# Patient Record
Sex: MALE | Race: OTHER | NOT HISPANIC OR LATINO | URBAN - METROPOLITAN AREA
[De-identification: names, ages, dates, MRNs, and addresses within clinical notes are randomized per-mention and may not be internally consistent; named-entity substitution may affect disease eponyms.]

---

## 2021-06-24 ENCOUNTER — EMERGENCY (EMERGENCY)
Facility: HOSPITAL | Age: 46
LOS: 1 days | Discharge: HOME | End: 2021-06-24
Attending: EMERGENCY MEDICINE | Admitting: STUDENT IN AN ORGANIZED HEALTH CARE EDUCATION/TRAINING PROGRAM
Payer: COMMERCIAL

## 2021-06-24 VITALS
TEMPERATURE: 98 F | SYSTOLIC BLOOD PRESSURE: 160 MMHG | HEART RATE: 67 BPM | DIASTOLIC BLOOD PRESSURE: 90 MMHG | OXYGEN SATURATION: 100 % | RESPIRATION RATE: 18 BRPM

## 2021-06-24 LAB
ALBUMIN SERPL ELPH-MCNC: 4.6 G/DL — SIGNIFICANT CHANGE UP (ref 3.3–5)
ALP SERPL-CCNC: 77 U/L — SIGNIFICANT CHANGE UP (ref 40–120)
ALT FLD-CCNC: 33 U/L — SIGNIFICANT CHANGE UP (ref 4–41)
ANION GAP SERPL CALC-SCNC: 12 MMOL/L — SIGNIFICANT CHANGE UP (ref 7–14)
APPEARANCE UR: CLEAR — SIGNIFICANT CHANGE UP
AST SERPL-CCNC: 21 U/L — SIGNIFICANT CHANGE UP (ref 4–40)
B PERT DNA SPEC QL NAA+PROBE: SIGNIFICANT CHANGE UP
BASOPHILS # BLD AUTO: 0.05 K/UL — SIGNIFICANT CHANGE UP (ref 0–0.2)
BASOPHILS NFR BLD AUTO: 0.5 % — SIGNIFICANT CHANGE UP (ref 0–2)
BILIRUB SERPL-MCNC: 0.6 MG/DL — SIGNIFICANT CHANGE UP (ref 0.2–1.2)
BILIRUB UR-MCNC: NEGATIVE — SIGNIFICANT CHANGE UP
BUN SERPL-MCNC: 15 MG/DL — SIGNIFICANT CHANGE UP (ref 7–23)
C PNEUM DNA SPEC QL NAA+PROBE: SIGNIFICANT CHANGE UP
CALCIUM SERPL-MCNC: 9.2 MG/DL — SIGNIFICANT CHANGE UP (ref 8.4–10.5)
CHLORIDE SERPL-SCNC: 101 MMOL/L — SIGNIFICANT CHANGE UP (ref 98–107)
CO2 SERPL-SCNC: 26 MMOL/L — SIGNIFICANT CHANGE UP (ref 22–31)
COLOR SPEC: YELLOW — SIGNIFICANT CHANGE UP
CREAT SERPL-MCNC: 1.13 MG/DL — SIGNIFICANT CHANGE UP (ref 0.5–1.3)
DIFF PNL FLD: ABNORMAL
EOSINOPHIL # BLD AUTO: 0.24 K/UL — SIGNIFICANT CHANGE UP (ref 0–0.5)
EOSINOPHIL NFR BLD AUTO: 2.2 % — SIGNIFICANT CHANGE UP (ref 0–6)
FLUAV SUBTYP SPEC NAA+PROBE: SIGNIFICANT CHANGE UP
FLUBV RNA SPEC QL NAA+PROBE: SIGNIFICANT CHANGE UP
GLUCOSE SERPL-MCNC: 144 MG/DL — HIGH (ref 70–99)
GLUCOSE UR QL: NEGATIVE — SIGNIFICANT CHANGE UP
HADV DNA SPEC QL NAA+PROBE: SIGNIFICANT CHANGE UP
HCOV 229E RNA SPEC QL NAA+PROBE: SIGNIFICANT CHANGE UP
HCOV HKU1 RNA SPEC QL NAA+PROBE: SIGNIFICANT CHANGE UP
HCOV NL63 RNA SPEC QL NAA+PROBE: SIGNIFICANT CHANGE UP
HCOV OC43 RNA SPEC QL NAA+PROBE: SIGNIFICANT CHANGE UP
HCT VFR BLD CALC: 44.5 % — SIGNIFICANT CHANGE UP (ref 39–50)
HGB BLD-MCNC: 14.2 G/DL — SIGNIFICANT CHANGE UP (ref 13–17)
HMPV RNA SPEC QL NAA+PROBE: SIGNIFICANT CHANGE UP
HPIV1 RNA SPEC QL NAA+PROBE: SIGNIFICANT CHANGE UP
HPIV2 RNA SPEC QL NAA+PROBE: SIGNIFICANT CHANGE UP
HPIV3 RNA SPEC QL NAA+PROBE: SIGNIFICANT CHANGE UP
HPIV4 RNA SPEC QL NAA+PROBE: SIGNIFICANT CHANGE UP
IANC: 6.93 K/UL — SIGNIFICANT CHANGE UP (ref 1.5–8.5)
IMM GRANULOCYTES NFR BLD AUTO: 0.4 % — SIGNIFICANT CHANGE UP (ref 0–1.5)
KETONES UR-MCNC: NEGATIVE — SIGNIFICANT CHANGE UP
LEUKOCYTE ESTERASE UR-ACNC: NEGATIVE — SIGNIFICANT CHANGE UP
LYMPHOCYTES # BLD AUTO: 2.86 K/UL — SIGNIFICANT CHANGE UP (ref 1–3.3)
LYMPHOCYTES # BLD AUTO: 26.1 % — SIGNIFICANT CHANGE UP (ref 13–44)
MCHC RBC-ENTMCNC: 28.1 PG — SIGNIFICANT CHANGE UP (ref 27–34)
MCHC RBC-ENTMCNC: 31.9 GM/DL — LOW (ref 32–36)
MCV RBC AUTO: 88.1 FL — SIGNIFICANT CHANGE UP (ref 80–100)
MONOCYTES # BLD AUTO: 0.84 K/UL — SIGNIFICANT CHANGE UP (ref 0–0.9)
MONOCYTES NFR BLD AUTO: 7.7 % — SIGNIFICANT CHANGE UP (ref 2–14)
NEUTROPHILS # BLD AUTO: 6.93 K/UL — SIGNIFICANT CHANGE UP (ref 1.8–7.4)
NEUTROPHILS NFR BLD AUTO: 63.1 % — SIGNIFICANT CHANGE UP (ref 43–77)
NITRITE UR-MCNC: NEGATIVE — SIGNIFICANT CHANGE UP
NRBC # BLD: 0 /100 WBCS — SIGNIFICANT CHANGE UP
NRBC # FLD: 0 K/UL — SIGNIFICANT CHANGE UP
PH UR: 6 — SIGNIFICANT CHANGE UP (ref 5–8)
PLATELET # BLD AUTO: 293 K/UL — SIGNIFICANT CHANGE UP (ref 150–400)
POTASSIUM SERPL-MCNC: 4.2 MMOL/L — SIGNIFICANT CHANGE UP (ref 3.5–5.3)
POTASSIUM SERPL-SCNC: 4.2 MMOL/L — SIGNIFICANT CHANGE UP (ref 3.5–5.3)
PROT SERPL-MCNC: 7.8 G/DL — SIGNIFICANT CHANGE UP (ref 6–8.3)
PROT UR-MCNC: ABNORMAL
RAPID RVP RESULT: SIGNIFICANT CHANGE UP
RBC # BLD: 5.05 M/UL — SIGNIFICANT CHANGE UP (ref 4.2–5.8)
RBC # FLD: 13.2 % — SIGNIFICANT CHANGE UP (ref 10.3–14.5)
RSV RNA SPEC QL NAA+PROBE: SIGNIFICANT CHANGE UP
RV+EV RNA SPEC QL NAA+PROBE: SIGNIFICANT CHANGE UP
SARS-COV-2 RNA SPEC QL NAA+PROBE: SIGNIFICANT CHANGE UP
SODIUM SERPL-SCNC: 139 MMOL/L — SIGNIFICANT CHANGE UP (ref 135–145)
SP GR SPEC: 1.03 — HIGH (ref 1.01–1.02)
UROBILINOGEN FLD QL: SIGNIFICANT CHANGE UP
WBC # BLD: 10.96 K/UL — HIGH (ref 3.8–10.5)
WBC # FLD AUTO: 10.96 K/UL — HIGH (ref 3.8–10.5)

## 2021-06-24 PROCEDURE — 74176 CT ABD & PELVIS W/O CONTRAST: CPT | Mod: 26

## 2021-06-24 PROCEDURE — 99218: CPT

## 2021-06-24 RX ORDER — KETOROLAC TROMETHAMINE 30 MG/ML
15 SYRINGE (ML) INJECTION EVERY 6 HOURS
Refills: 0 | Status: DISCONTINUED | OUTPATIENT
Start: 2021-06-24 | End: 2021-06-25

## 2021-06-24 RX ORDER — TAMSULOSIN HYDROCHLORIDE 0.4 MG/1
1 CAPSULE ORAL
Qty: 14 | Refills: 0
Start: 2021-06-24 | End: 2021-07-07

## 2021-06-24 RX ORDER — TAMSULOSIN HYDROCHLORIDE 0.4 MG/1
0.4 CAPSULE ORAL ONCE
Refills: 0 | Status: COMPLETED | OUTPATIENT
Start: 2021-06-24 | End: 2021-06-24

## 2021-06-24 RX ORDER — SODIUM CHLORIDE 9 MG/ML
1000 INJECTION INTRAMUSCULAR; INTRAVENOUS; SUBCUTANEOUS
Refills: 0 | Status: DISCONTINUED | OUTPATIENT
Start: 2021-06-24 | End: 2021-06-25

## 2021-06-24 RX ORDER — KETOROLAC TROMETHAMINE 30 MG/ML
15 SYRINGE (ML) INJECTION ONCE
Refills: 0 | Status: DISCONTINUED | OUTPATIENT
Start: 2021-06-24 | End: 2021-06-24

## 2021-06-24 RX ORDER — SODIUM CHLORIDE 9 MG/ML
1000 INJECTION INTRAMUSCULAR; INTRAVENOUS; SUBCUTANEOUS ONCE
Refills: 0 | Status: COMPLETED | OUTPATIENT
Start: 2021-06-24 | End: 2021-06-24

## 2021-06-24 RX ORDER — ONDANSETRON 8 MG/1
4 TABLET, FILM COATED ORAL ONCE
Refills: 0 | Status: COMPLETED | OUTPATIENT
Start: 2021-06-24 | End: 2021-06-24

## 2021-06-24 RX ADMIN — Medication 15 MILLIGRAM(S): at 11:44

## 2021-06-24 RX ADMIN — Medication 15 MILLIGRAM(S): at 19:41

## 2021-06-24 RX ADMIN — SODIUM CHLORIDE 125 MILLILITER(S): 9 INJECTION INTRAMUSCULAR; INTRAVENOUS; SUBCUTANEOUS at 11:43

## 2021-06-24 RX ADMIN — TAMSULOSIN HYDROCHLORIDE 0.4 MILLIGRAM(S): 0.4 CAPSULE ORAL at 11:11

## 2021-06-24 RX ADMIN — ONDANSETRON 4 MILLIGRAM(S): 8 TABLET, FILM COATED ORAL at 08:06

## 2021-06-24 RX ADMIN — Medication 15 MILLIGRAM(S): at 08:01

## 2021-06-24 RX ADMIN — Medication 15 MILLIGRAM(S): at 19:20

## 2021-06-24 RX ADMIN — SODIUM CHLORIDE 1000 MILLILITER(S): 9 INJECTION INTRAMUSCULAR; INTRAVENOUS; SUBCUTANEOUS at 08:06

## 2021-06-24 NOTE — ED CDU PROVIDER INITIAL DAY NOTE - NSCAREINITIATED _GEN_ER
Last office visit 2/8/2017  Last refill 11/02/2017  #30  
Patient called requesting refill    Aware Dr Esteves not in   Current Outpatient Prescriptions   Medication Sig   • HYDROcodone-acetaminophen (NORCO) 5-325 MG per tablet Take 1 tablet by mouth 2 times daily as needed for Pain.   • hydrochlorothiazide (HYDRODIURIL) 25 MG tablet Take 1 tablet by mouth daily.   • lisinopril (ZESTRIL) 10 MG tablet Take 1 tablet by mouth daily.   • simvastatin (ZOCOR) 10 MG tablet Take 1 tablet by mouth nightly.   • cyclobenzaprine (FLEXERIL) 5 MG tablet Take 1 tablet by mouth 3 times daily as needed for Muscle spasms.   • acetaminophen-codeine (TYLENOL NO.3) 300-30 MG per tablet 1 tab PO Q 6 hrs PRN Pain   • fluocinonide (LIDEX) 0.05 % cream Apply to affected area as needed     No current facility-administered medications for this visit.        
Peewee Rea(Resident)

## 2021-06-24 NOTE — ED PROVIDER NOTE - ABDOMINAL EXAM
mild right sided CVA tenderness, no abdominal tenderness, no suprapubic tenderness, no rebound, no guarding/soft/nondistended/no organomegaly

## 2021-06-24 NOTE — ED PROVIDER NOTE - CLINICAL SUMMARY MEDICAL DECISION MAKING FREE TEXT BOX
45 y/o M with h/o kidney stones presents with right flank pain onset several hours. Suspect likely kidney stone. Plan for basic labs, UA, pain management and stone hunt.

## 2021-06-24 NOTE — ED PROVIDER NOTE - NSFOLLOWUPINSTRUCTIONS_ED_ALL_ED_FT
Please follow up with your primary care provider for further concerns you may have regarding your general health. Attached you will find your results from today's visit. Continue taking your medications as prescribed and keep your upcoming medical appointments.    You have been diagnosed with a kidney stone. To control the pain, you should take Ibuprofen 400 mg along with Tylenol 650mg every 6 hours. These are both over the counter medications that you can  at your local pharmacy without a prescription. We also sent a stronger pain medication to your pharmacy that you should only take when you are still having pain despite trying Tylenol and Ibuprofen. If you are still having severe pain in 48 hours you should return to the emergency room or a urologist if able. If you began having fever, uncontrollable nausea and vomiting then you also need to come back to the ED.

## 2021-06-24 NOTE — ED CDU PROVIDER INITIAL DAY NOTE - NS ED ROS FT
ROS:  GENERAL: No fever, no chills  EYES: no change in vision  HEENT: no trouble swallowing, no trouble speaking  CARDIAC: no chest pain  PULMONARY: no cough, no shortness of breath  GI: +abdominal pain, +nausea, +vomiting, no diarrhea, no constipation  : No dysuria, no frequency  SKIN: no rashes  NEURO: no headache, no weakness  MSK: No joint pain

## 2021-06-24 NOTE — ED CDU PROVIDER INITIAL DAY NOTE - PHYSICAL EXAMINATION
Vital signs reviewed.   CONSTITUTIONAL: Well-appearing; well-nourished; in no apparent distress. Non-toxic appearing.   HEAD: Normocephalic, atraumatic.  EYES: PERRL, EOM intact, conjunctiva and sclera WNL.  NECK/LYMPH: Supple; non-tender  CARD: Normal S1, S2; RRR  RESP: Normal chest excursion with respiration; breath sounds clear and equal bilaterally; no wheezes, rhonchi, or rales.  ABD/GI: soft, non-distended; R CVAT noted  EXT/MS: moves all extremities; distal pulses are normal, no pedal edema.  SKIN: Normal for age and race; warm; dry; good turgor; no apparent lesions or exudate noted.  NEURO: Awake, alert, oriented x 3, no gross deficits, CN II-XII grossly intact, no motor or sensory deficit noted.  PSYCH: Normal mood; appropriate affect.

## 2021-06-24 NOTE — ED PROVIDER NOTE - OBJECTIVE STATEMENT
45 y/o M with h/o recurrent kidney stones presents to the ED c/o right flank pain starting acutely this morning. Pain is localized to the right flank without radiating. Endorsing nausea. No urinary symptoms. No medication taken. Denies chest pain, fever, chills, vomiting. No further abdominal pain. PSHx: appendectomy

## 2021-06-24 NOTE — ED PROVIDER NOTE - PROGRESS NOTE DETAILS
eleno: apparent prox kidney stone, will cdu for pain management. Pt improved but pain starting to come back.  WAnts to hold on pain medication for now. Pt to go to the cdu for pain management and observation.

## 2021-06-24 NOTE — ED CDU PROVIDER INITIAL DAY NOTE - MEDICAL DECISION MAKING DETAILS
Kidney stone/renal colic- pain control, gentle hydration, am labs Kidney stone/renal colic- pain control, gentle hydration, am labs Pt much improved from ED but still with intermittent pain.  IVF, pain control and reassess

## 2021-06-24 NOTE — ED CDU PROVIDER INITIAL DAY NOTE - OBJECTIVE STATEMENT
47 y/o M with h/o recurrent kidney stones presents to the ED c/o right flank pain starting acutely this morning. Pain is localized to the right flank without radiating. Endorsing nausea. No urinary symptoms. No medication taken. Denies chest pain, fever, chills, vomiting. No further abdominal pain. PSHx: appendectomu    CDU JUAN JOSÉ Shin: Agree with above except as noted. Patient is a 47 y/o M w/ hx kidney stones (never requiring surgical intervention), here c/o sudden onset of R flank pain this morning. In the ED, labs were unremarkable, CTAP confirmed a proximal R 0.4cm ureteral stone. Pt was sent to the CDU for pain control. Pt denies dysuria, states pain is well controlled with Toradol, has received 2 doses so far. Denies fevers, dysuria, or any other complaints.

## 2021-06-24 NOTE — ED PROVIDER NOTE - CPE EDP SKIN NORM
[address] [facility] [address]  Department of Interventional Radiology  [phone] Office  [phone] Fax  POST LUMBAR PUNCTURE/MYELOGRAM/INTRATHECAL CHEMOTHERAPY DISCHARGE INSTRUCTIONS  General Information:  Lumbar Puncture: A LP is done to help diagnose several disorders, like pseudo tumor, migraines, meningitis, and multiple sclerosis. It involves a puncture (usually in the lower spine) into the sac that protects the spinal column. A sample of the fluid in that space is removed and tested in the lab. Myelogram:     A Myelogram involves a lumbar puncture, and instead of removing fluid, contrast will be injected into the sac surrounding the spinal column. It is done to visualize the spinal column, nerve roots, spinal canal, vertebral discs and disc space. It is usually done to diagnose back pain with unknown cause or in preparation for surgery. After the injection, a CT scan will be done, usually within two hours of the injection. Intrathecal Chemotherapy:      Chemotherapy can be given in many forms. Intrathecal chemo involves a lumbar puncture, and instead of removing fluid, the chemo will be injected into the space. After any of procedures, you will be asked to lie flat on your back for 4-6 hours to prevent complications. You should also rest for 24 hours after you go home, and force fluids. If you have a headache, you should take Tylenol or acetaminophen. Call If:     You should call your Physician and/or the Radiology Nurse if you develop a headache that is not relieved by Tylenol, and worsens when you stand and eases when you lie down, you need to call. You may have developed what is referred to as a spinal headache. Our physician's will probably advise you to be on strict bed rest for 24 hours, to drink lots of fluids and caffeine. If this does not help the head pain, call again the next day.  You should call if you have bleeding other than a small spot on your bandage. You should call if you have any numbness, tingling, weakness, fever, chills, urinary retention, severe itching, rash, welts, swelling, or confusion. Follow-Up Instructions: See the doctor who ordered your procedure as he/she has instructed. If you had a Lumbar Puncture or Myelogram, your results should be available to your ordering doctor in 3-5 business days. You can remove your dressing in 24 hours and shower regularly. Do not bathe or swim for 72 hours. To Reach Us: If you have any questions about your procedure, please call the Interventional Radiology department at [phone]. After business hours (5pm) and weekends, call the answering service at [phone] and ask for the Radiologist on call to be paged. Interventional Radiology General Nurse Discharge    After general anesthesia or intravenous sedation, for 24 hours or while taking prescription Narcotics:  · Limit your activities  · Do not drive and operate hazardous machinery  · Do not make important personal or business decisions  · Do  not drink alcoholic beverages  · If you have not urinated within 8 hours after discharge, please contact your surgeon on call. * Please give a list of your current medications to your Primary Care Provider. * Please update this list whenever your medications are discontinued, doses are     changed, or new medications (including over-the-counter products) are added. * Please carry medication information at all times in case of emergency situations. These are general instructions for a healthy lifestyle:    No smoking/ No tobacco products/ Avoid exposure to second hand smoke  Surgeon General's Warning:  Quitting smoking now greatly reduces serious risk to your health.     Obesity, smoking, and sedentary lifestyle greatly increases your risk for illness  A healthy diet, regular physical exercise & weight monitoring are important for maintaining a healthy lifestyle    You may be retaining fluid if you have a history of heart failure or if you experience any of the following symptoms:  Weight gain of 3 pounds or more overnight or 5 pounds in a week, increased swelling in our hands or feet or shortness of breath while lying flat in bed. Please call your doctor as soon as you notice any of these symptoms; do not wait until your next office visit. Recognize signs and symptoms of STROKE:  F-face looks uneven    A-arms unable to move or move unevenly    S-speech slurred or non-existent    T-time-call 911 as soon as signs and symptoms begin-DO NOT go       Back to bed or wait to see if you get better-TIME IS BRAIN.       Patient Signature:  Date: 10/5/2017  Discharging Nurse: Frankie Castano normal...

## 2021-06-24 NOTE — ED PROVIDER NOTE - RAPID ASSESSMENT
45 y/o M h/o  ree kid stone wit h r flank acutely thia moring pain loc o r flank without rad no urianry sym no med taken denies cp or kali abd pain f c v endoring nausea  tez

## 2021-06-24 NOTE — ED PROVIDER NOTE - ATTENDING CONTRIBUTION TO CARE
I performed a face to face evaluation of this patient and performed a full history and physical examination on the patient.  I agree with the resident's history, physical examination, and plan of the patient.  45 y/o M with h/o kidney stones presents with right flank pain onset several hours. Suspect likely kidney stone. Plan for basic labs, UA, pain management and stone hunt.  Right flank pain Belly soft nontender, right cvat, no dysuria, likely renal colic

## 2021-06-24 NOTE — ED CDU PROVIDER INITIAL DAY NOTE - ATTENDING CONTRIBUTION TO CARE
I performed a face to face evaluation of this patient and obtained a history and performed a full exam.  I agree with the history, physical exam and plan of the PA.  Kidney stone/renal colic- pain control, gentle hydration, am labs Pt much improved from ED but still with intermittent pain.  IVF, pain control and reassess

## 2021-06-25 VITALS
SYSTOLIC BLOOD PRESSURE: 104 MMHG | DIASTOLIC BLOOD PRESSURE: 59 MMHG | OXYGEN SATURATION: 100 % | TEMPERATURE: 98 F | RESPIRATION RATE: 15 BRPM | HEART RATE: 73 BPM

## 2021-06-25 LAB
CULTURE RESULTS: SIGNIFICANT CHANGE UP
SPECIMEN SOURCE: SIGNIFICANT CHANGE UP

## 2021-06-25 PROCEDURE — 99217: CPT

## 2021-06-25 RX ORDER — ONDANSETRON 8 MG/1
1 TABLET, FILM COATED ORAL
Qty: 12 | Refills: 0
Start: 2021-06-25 | End: 2021-06-27

## 2021-06-25 RX ORDER — OXYCODONE HYDROCHLORIDE 5 MG/1
1 TABLET ORAL
Qty: 8 | Refills: 0
Start: 2021-06-25 | End: 2021-06-26

## 2021-06-25 RX ORDER — TAMSULOSIN HYDROCHLORIDE 0.4 MG/1
1 CAPSULE ORAL
Qty: 14 | Refills: 0
Start: 2021-06-25 | End: 2021-07-08

## 2021-06-25 RX ORDER — IBUPROFEN 200 MG
1 TABLET ORAL
Qty: 15 | Refills: 0
Start: 2021-06-25 | End: 2021-06-29

## 2021-06-25 RX ADMIN — SODIUM CHLORIDE 125 MILLILITER(S): 9 INJECTION INTRAMUSCULAR; INTRAVENOUS; SUBCUTANEOUS at 00:25

## 2021-06-25 RX ADMIN — Medication 15 MILLIGRAM(S): at 03:14

## 2021-06-25 RX ADMIN — Medication 15 MILLIGRAM(S): at 03:50

## 2021-06-25 NOTE — ED CDU PROVIDER DISPOSITION NOTE - NSFOLLOWUPINSTRUCTIONS_ED_ALL_ED_FT
Advance activity as tolerated.  Continue all previously prescribed medications as directed unless otherwise instructed.  Take Motrin 600 mg every 8 hours as needed for moderate pain or fevers -- take with food. Take Percocet 325/5 once every 6 hours as needed for severe pain -- causes drowsiness; DO NOT drink alcohol, drive, or operate heavy machinery with this medication.  Caution federal law prohibits the transfer of this drug to any person other  than the person for whom it was prescribed. May cause drowsiness.  Alcohol may intensify this effect.  Use care when operating dangerous machinery.  This prescription cannot be refilled. Using more of this medication than prescribed may cause serious breathing problems  Take Zofran 4 mg ODT every 8 hours as needed for nausea and vomiting.  Follow up with your primary care physician and (referral list provided or you may call the urology clinic ([phone]) to make an appointment)  in 48-72 hours- bring copies of your results.  Return to the ER for worsening or persistent symptoms, including but not limited worsening/persistent pain, fevers, vomiting, and/or ANY NEW OR CONCERNING SYMPTOMS. If you have issues obtaining follow up, please call: [phone] to obtain a doctor or specialist who takes your insurance in your area.  You may call [phone] to make an appointment with the internal medicine clinic. Advance activity as tolerated.  Continue all previously prescribed medications as directed unless otherwise instructed.  Take Motrin 600 mg every 8 hours as needed for moderate pain or fevers -- take with food. Take Percocet 325/5 once every 6 hours as needed for severe pain -- causes drowsiness; DO NOT drink alcohol, drive, or operate heavy machinery with this medication.  Caution federal law prohibits the transfer of this drug to any person other  than the person for whom it was prescribed. May cause drowsiness.  Alcohol may intensify this effect.  Use care when operating dangerous machinery.  This prescription cannot be refilled. Using more of this medication than prescribed may cause serious breathing problems  Take Zofran 4 mg ODT every 8 hours as needed for nausea and vomiting.  Take Flomax 0.4 mg once a day at night -- causes lightheadedness; when standing up, sit up slowly first then stand up gradually.  Follow up with your primary care physician and (referral list provided or you may call the urology clinic ([phone]) to make an appointment)  in 48-72 hours- bring copies of your results.  Return to the ER for worsening or persistent symptoms, including but not limited worsening/persistent pain, fevers, vomiting, and/or ANY NEW OR CONCERNING SYMPTOMS. If you have issues obtaining follow up, please call: [phone] to obtain a doctor or specialist who takes your insurance in your area.  You may call [phone] to make an appointment with the internal medicine clinic.

## 2021-06-25 NOTE — ED CDU PROVIDER DISPOSITION NOTE - CLINICAL COURSE
45 y/o M w/ hx kidney stones (never requiring surgical intervention) - presented to ED For sudden onset of R flank pain. In the ED, labs were unremarkable, CTAP confirmed a proximal R 0.4cm ureteral stone. Pt referred to the CDU for pain control. Pain has improved and been well during CDU stay; no other complaints, tolerating po w/out difficulty. Plan to discharge with close outpt follow up and pain meds.

## 2021-06-25 NOTE — ED CDU PROVIDER SUBSEQUENT DAY NOTE - ATTENDING CONTRIBUTION TO CARE
Justino WALTER: I agree with the above provided history and exam and addend/modify it as follows.    47 y/o M w/ hx kidney stones (never requiring surgical intervention) - presented to ED For sudden onset of R flank pain. In the ED, labs were unremarkable, CTAP confirmed a proximal R 0.4cm ureteral stone. Pt referred to the CDU for pain control. Pain has improved and been well during CDU stay; no other complaints, tolerating po w/out difficulty. Plan to discharge with close outpt follow up and pain meds.     I Isra Badillo MD performed a history and physical exam of the patient and discussed their management with the resident and /or advanced care provider. I reviewed the resident and /or ACP's note and agree with the documented findings and plan of care. My medical decision making and observations are found above.

## 2021-06-25 NOTE — ED CDU PROVIDER SUBSEQUENT DAY NOTE - PROGRESS NOTE DETAILS
Pt reports feeling significantly better.  Pt tolerating PO.  Pt medically stable for discharge.  Strict return precautions given.  Pt to follow up with PMD and urology (referral list provided).  Reassessment performed and plan for discharge discussed with Dr. Badillo who agrees with disposition and discharge plan.

## 2021-06-25 NOTE — ED CDU PROVIDER SUBSEQUENT DAY NOTE - HISTORY
45 y/o M w/ hx kidney stones (never requiring surgical intervention), here c/o sudden onset of R flank pain this morning. In the ED, labs were unremarkable, CTAP confirmed a proximal R 0.4cm ureteral stone. Pt was sent to the CDU for pain control. Pt denies dysuria, states pain is well controlled with Toradol, has received 2 doses so far. Denies fevers, dysuria, or any other complaints.  Pt without any complaints overnight. Resting comfortably. VSS.

## 2021-06-25 NOTE — ED CDU PROVIDER DISPOSITION NOTE - PATIENT PORTAL LINK FT
You can access the FollowMyHealth Patient Portal offered by [facility] by registering at the following website: [email or web address]. By joining [employer]’s FollowMyHealth portal, you will also be able to view your health information using other applications (apps) compatible with our system.

## 2021-06-29 ENCOUNTER — INPATIENT (INPATIENT)
Facility: HOSPITAL | Age: 46
LOS: 1 days | Discharge: HOME | End: 2021-07-01
Attending: SPECIALIST | Admitting: SPECIALIST
Payer: COMMERCIAL

## 2021-06-29 VITALS
DIASTOLIC BLOOD PRESSURE: 84 MMHG | SYSTOLIC BLOOD PRESSURE: 159 MMHG | HEART RATE: 73 BPM | TEMPERATURE: 99 F | RESPIRATION RATE: 18 BRPM | OXYGEN SATURATION: 100 %

## 2021-06-29 DIAGNOSIS — N23 UNSPECIFIED RENAL COLIC: ICD-10-CM

## 2021-06-29 DIAGNOSIS — Z90.49 ACQUIRED ABSENCE OF OTHER SPECIFIED PARTS OF DIGESTIVE TRACT: Chronic | ICD-10-CM

## 2021-06-29 PROBLEM — Z00.00 ENCOUNTER FOR PREVENTIVE HEALTH EXAMINATION: Status: ACTIVE | Noted: 2021-06-29

## 2021-06-29 LAB
ALBUMIN SERPL ELPH-MCNC: 3.9 G/DL — SIGNIFICANT CHANGE UP (ref 3.3–5)
ALP SERPL-CCNC: 62 U/L — SIGNIFICANT CHANGE UP (ref 40–120)
ALT FLD-CCNC: 22 U/L — SIGNIFICANT CHANGE UP (ref 4–41)
ANION GAP SERPL CALC-SCNC: 10 MMOL/L — SIGNIFICANT CHANGE UP (ref 7–14)
APPEARANCE UR: CLEAR — SIGNIFICANT CHANGE UP
AST SERPL-CCNC: 15 U/L — SIGNIFICANT CHANGE UP (ref 4–40)
B PERT DNA SPEC QL NAA+PROBE: SIGNIFICANT CHANGE UP
BILIRUB SERPL-MCNC: 0.3 MG/DL — SIGNIFICANT CHANGE UP (ref 0.2–1.2)
BILIRUB UR-MCNC: NEGATIVE — SIGNIFICANT CHANGE UP
BUN SERPL-MCNC: 17 MG/DL — SIGNIFICANT CHANGE UP (ref 7–23)
C PNEUM DNA SPEC QL NAA+PROBE: SIGNIFICANT CHANGE UP
CALCIUM SERPL-MCNC: 9 MG/DL — SIGNIFICANT CHANGE UP (ref 8.4–10.5)
CHLORIDE SERPL-SCNC: 100 MMOL/L — SIGNIFICANT CHANGE UP (ref 98–107)
CO2 SERPL-SCNC: 27 MMOL/L — SIGNIFICANT CHANGE UP (ref 22–31)
COLOR SPEC: SIGNIFICANT CHANGE UP
CREAT SERPL-MCNC: 1.92 MG/DL — HIGH (ref 0.5–1.3)
DIFF PNL FLD: NEGATIVE — SIGNIFICANT CHANGE UP
FLUAV SUBTYP SPEC NAA+PROBE: SIGNIFICANT CHANGE UP
FLUBV RNA SPEC QL NAA+PROBE: SIGNIFICANT CHANGE UP
GLUCOSE SERPL-MCNC: 101 MG/DL — HIGH (ref 70–99)
GLUCOSE UR QL: NEGATIVE — SIGNIFICANT CHANGE UP
HADV DNA SPEC QL NAA+PROBE: SIGNIFICANT CHANGE UP
HCOV 229E RNA SPEC QL NAA+PROBE: SIGNIFICANT CHANGE UP
HCOV HKU1 RNA SPEC QL NAA+PROBE: SIGNIFICANT CHANGE UP
HCOV NL63 RNA SPEC QL NAA+PROBE: SIGNIFICANT CHANGE UP
HCOV OC43 RNA SPEC QL NAA+PROBE: SIGNIFICANT CHANGE UP
HCT VFR BLD CALC: 37.3 % — LOW (ref 39–50)
HGB BLD-MCNC: 12.4 G/DL — LOW (ref 13–17)
HMPV RNA SPEC QL NAA+PROBE: SIGNIFICANT CHANGE UP
HPIV1 RNA SPEC QL NAA+PROBE: SIGNIFICANT CHANGE UP
HPIV2 RNA SPEC QL NAA+PROBE: SIGNIFICANT CHANGE UP
HPIV3 RNA SPEC QL NAA+PROBE: SIGNIFICANT CHANGE UP
HPIV4 RNA SPEC QL NAA+PROBE: SIGNIFICANT CHANGE UP
KETONES UR-MCNC: NEGATIVE — SIGNIFICANT CHANGE UP
LEUKOCYTE ESTERASE UR-ACNC: NEGATIVE — SIGNIFICANT CHANGE UP
MCHC RBC-ENTMCNC: 28.8 PG — SIGNIFICANT CHANGE UP (ref 27–34)
MCHC RBC-ENTMCNC: 33.2 GM/DL — SIGNIFICANT CHANGE UP (ref 32–36)
MCV RBC AUTO: 86.7 FL — SIGNIFICANT CHANGE UP (ref 80–100)
NITRITE UR-MCNC: NEGATIVE — SIGNIFICANT CHANGE UP
NRBC # BLD: 0 /100 WBCS — SIGNIFICANT CHANGE UP
NRBC # FLD: 0 K/UL — SIGNIFICANT CHANGE UP
PH UR: 6.5 — SIGNIFICANT CHANGE UP (ref 5–8)
PLATELET # BLD AUTO: 256 K/UL — SIGNIFICANT CHANGE UP (ref 150–400)
POTASSIUM SERPL-MCNC: 4.4 MMOL/L — SIGNIFICANT CHANGE UP (ref 3.5–5.3)
POTASSIUM SERPL-SCNC: 4.4 MMOL/L — SIGNIFICANT CHANGE UP (ref 3.5–5.3)
PROT SERPL-MCNC: 7.2 G/DL — SIGNIFICANT CHANGE UP (ref 6–8.3)
PROT UR-MCNC: NEGATIVE — SIGNIFICANT CHANGE UP
RAPID RVP RESULT: SIGNIFICANT CHANGE UP
RBC # BLD: 4.3 M/UL — SIGNIFICANT CHANGE UP (ref 4.2–5.8)
RBC # FLD: 12.9 % — SIGNIFICANT CHANGE UP (ref 10.3–14.5)
RSV RNA SPEC QL NAA+PROBE: SIGNIFICANT CHANGE UP
RV+EV RNA SPEC QL NAA+PROBE: SIGNIFICANT CHANGE UP
SARS-COV-2 RNA SPEC QL NAA+PROBE: SIGNIFICANT CHANGE UP
SODIUM SERPL-SCNC: 137 MMOL/L — SIGNIFICANT CHANGE UP (ref 135–145)
SP GR SPEC: 1.02 — SIGNIFICANT CHANGE UP (ref 1.01–1.02)
UROBILINOGEN FLD QL: ABNORMAL
WBC # BLD: 10.9 K/UL — HIGH (ref 3.8–10.5)
WBC # FLD AUTO: 10.9 K/UL — HIGH (ref 3.8–10.5)

## 2021-06-29 PROCEDURE — 99221 1ST HOSP IP/OBS SF/LOW 40: CPT

## 2021-06-29 PROCEDURE — 99285 EMERGENCY DEPT VISIT HI MDM: CPT

## 2021-06-29 PROCEDURE — 74176 CT ABD & PELVIS W/O CONTRAST: CPT | Mod: 26

## 2021-06-29 RX ORDER — KETOROLAC TROMETHAMINE 30 MG/ML
15 SYRINGE (ML) INJECTION ONCE
Refills: 0 | Status: DISCONTINUED | OUTPATIENT
Start: 2021-06-29 | End: 2021-06-29

## 2021-06-29 RX ORDER — ONDANSETRON 8 MG/1
4 TABLET, FILM COATED ORAL ONCE
Refills: 0 | Status: COMPLETED | OUTPATIENT
Start: 2021-06-29 | End: 2021-06-29

## 2021-06-29 RX ORDER — SODIUM CHLORIDE 9 MG/ML
1000 INJECTION, SOLUTION INTRAVENOUS ONCE
Refills: 0 | Status: COMPLETED | OUTPATIENT
Start: 2021-06-29 | End: 2021-06-29

## 2021-06-29 RX ORDER — ONDANSETRON 8 MG/1
4 TABLET, FILM COATED ORAL EVERY 6 HOURS
Refills: 0 | Status: DISCONTINUED | OUTPATIENT
Start: 2021-06-29 | End: 2021-07-01

## 2021-06-29 RX ORDER — HEPARIN SODIUM 5000 [USP'U]/ML
5000 INJECTION INTRAVENOUS; SUBCUTANEOUS EVERY 8 HOURS
Refills: 0 | Status: DISCONTINUED | OUTPATIENT
Start: 2021-06-29 | End: 2021-07-01

## 2021-06-29 RX ORDER — SODIUM CHLORIDE 9 MG/ML
1000 INJECTION, SOLUTION INTRAVENOUS
Refills: 0 | Status: DISCONTINUED | OUTPATIENT
Start: 2021-06-29 | End: 2021-06-29

## 2021-06-29 RX ORDER — TAMSULOSIN HYDROCHLORIDE 0.4 MG/1
0.4 CAPSULE ORAL AT BEDTIME
Refills: 0 | Status: DISCONTINUED | OUTPATIENT
Start: 2021-06-29 | End: 2021-07-01

## 2021-06-29 RX ORDER — POLYETHYLENE GLYCOL 3350 17 G/17G
17 POWDER, FOR SOLUTION ORAL DAILY
Refills: 0 | Status: DISCONTINUED | OUTPATIENT
Start: 2021-06-29 | End: 2021-07-01

## 2021-06-29 RX ORDER — ACETAMINOPHEN 500 MG
650 TABLET ORAL EVERY 6 HOURS
Refills: 0 | Status: DISCONTINUED | OUTPATIENT
Start: 2021-06-29 | End: 2021-07-01

## 2021-06-29 RX ORDER — SENNA PLUS 8.6 MG/1
2 TABLET ORAL AT BEDTIME
Refills: 0 | Status: DISCONTINUED | OUTPATIENT
Start: 2021-06-29 | End: 2021-07-01

## 2021-06-29 RX ORDER — SODIUM CHLORIDE 9 MG/ML
1000 INJECTION, SOLUTION INTRAVENOUS
Refills: 0 | Status: DISCONTINUED | OUTPATIENT
Start: 2021-06-29 | End: 2021-07-01

## 2021-06-29 RX ORDER — HYDROMORPHONE HYDROCHLORIDE 2 MG/ML
0.5 INJECTION INTRAMUSCULAR; INTRAVENOUS; SUBCUTANEOUS EVERY 4 HOURS
Refills: 0 | Status: DISCONTINUED | OUTPATIENT
Start: 2021-06-29 | End: 2021-07-01

## 2021-06-29 RX ORDER — HYDROMORPHONE HYDROCHLORIDE 2 MG/ML
1 INJECTION INTRAMUSCULAR; INTRAVENOUS; SUBCUTANEOUS EVERY 4 HOURS
Refills: 0 | Status: DISCONTINUED | OUTPATIENT
Start: 2021-06-29 | End: 2021-07-01

## 2021-06-29 RX ADMIN — SODIUM CHLORIDE 125 MILLILITER(S): 9 INJECTION, SOLUTION INTRAVENOUS at 23:07

## 2021-06-29 RX ADMIN — HEPARIN SODIUM 5000 UNIT(S): 5000 INJECTION INTRAVENOUS; SUBCUTANEOUS at 23:08

## 2021-06-29 RX ADMIN — SODIUM CHLORIDE 125 MILLILITER(S): 9 INJECTION, SOLUTION INTRAVENOUS at 17:56

## 2021-06-29 RX ADMIN — ONDANSETRON 4 MILLIGRAM(S): 8 TABLET, FILM COATED ORAL at 11:37

## 2021-06-29 RX ADMIN — Medication 15 MILLIGRAM(S): at 11:38

## 2021-06-29 RX ADMIN — SODIUM CHLORIDE 1000 MILLILITER(S): 9 INJECTION, SOLUTION INTRAVENOUS at 11:38

## 2021-06-29 RX ADMIN — TAMSULOSIN HYDROCHLORIDE 0.4 MILLIGRAM(S): 0.4 CAPSULE ORAL at 23:08

## 2021-06-29 NOTE — ED PROVIDER NOTE - CLINICAL SUMMARY MEDICAL DECISION MAKING FREE TEXT BOX
Impression renal colic associated with constipation. Maybe due to pain medications. Plan to obtain labs, pain control, abdominal CT and reassess.

## 2021-06-29 NOTE — H&P ADULT - HISTORY OF PRESENT ILLNESS
47yo M with PMH nephrolithiasis (never seen by urology) presented to ED with severe right sided flank pain. He was recently observed in CDU 4 days ago for right flank pain, CTAP demonstrated 4mm obstructing right ureteral stone. He was given oxycodone, which he did not tolerate well at home (felt delirious, constipation) and did not control his pain well. States pain was 10/10 but is now controlled. Denies fever, chills, nausea, vomiting. Denies urinary urgency, frequency, dysuria, hematuria. Of note, patient reports improvement of urinary stream since taking flomax for the past few days. Reports weak stream for the past 1-2 years.  In ED, CTAP repeated and redemonstrated 4mm obstructing R ureteral stone with associated hydro and stranding. Labs significant for elevated Cr (1.92 from 1.13 on 6/24).  Urine culture from 6/24 normal  summer.

## 2021-06-29 NOTE — ED PROVIDER NOTE - ABDOMINAL EXAM
mild tenderness to right flank, not radiating to groin, +bowel sounds I have personally seen and examined this patient.  I have fully participated in the care of this patient. I have reviewed all pertinent clinical information, including history, physical exam, plan and the Resident’s note and agree except as noted.

## 2021-06-29 NOTE — H&P ADULT - NSHPLABSRESULTS_GEN_ALL_CORE
< from: CT Abdomen and Pelvis No Cont (06.29.21 @ 13:40) >      EXAM:  CT ABDOMEN AND PELVIS        PROCEDURE DATE:  Jun 29 2021         INTERPRETATION:  CLINICAL INFORMATION: Right flank pain. Evaluate for stone.    COMPARISON: Prior CT dated 6/24/2021.    CONTRAST/COMPLICATIONS:  IV Contrast: NONE  Oral Contrast: NONE  Complications: None reported at time of study completion    PROCEDURE:  CT of the Abdomen and Pelvis was performed.  Sagittal and coronal reformats were performed.    FINDINGS:  LOWER CHEST: Bibasilar linear atelectasis.    LIVER: Diffuse marked hepatic steatosis. Hepatomegaly.  BILE DUCTS: Normal caliber.  GALLBLADDER: Within normal limits.  SPLEEN: Within normal limits.  PANCREAS: Within normal limits.  ADRENALS: Within normal limits.  KIDNEYS/URETERS: Mild to moderate right hydroureteronephrosis, slightly increased since 6/24/2021. A 0.4 cm calculus is noted in the mid right ureter and has progressed minimally distally since the prior examination. Right perinephric stranding is slightly increased.    BLADDER: Within normal limits.  REPRODUCTIVE ORGANS: Prostate gland is within normal limits.    BOWEL: No bowel obstruction. Status post appendectomy. Scattered colonic diverticulosis.  PERITONEUM: No ascites.  VESSELS: Minimal atherosclerotic calcification.  RETROPERITONEUM/LYMPH NODES: No lymphadenopathy.  ABDOMINAL WALL: Within normal limits.  BONES: Degenerative changes.    IMPRESSION:  Mild to moderate right hydroureteronephrosis, slightly increased since 6/24/2021. A 0.4 cm calculus is seen in the mid right ureter and has moved slightly distally since the prior study. Increased infiltration of the right perinephric fat is noted. Underlying infection cannot be excluded.    Hepatomegaly and marked diffuse hepatic steatosis.                RUPA QUACH MD; Attending Radiologist  This document has been electronically signed. Jun 29 2021  1:53PM    < end of copied text >

## 2021-06-29 NOTE — ED PROVIDER NOTE - OBJECTIVE STATEMENT
47 y/o M h/o kidney stone present to the ED for the 2nd time for severe right sided flank pain. Non-radiating to groin. Associated with some constipation. Seen here and stayed overnight in the CDU and was sent home. Still having pain and pt is tearful. No dysuria. No obvious gross hematuria. Pain is 10/10. Had relief with Toradol last time. Last pain medication taken last night which was Ibuprofen 400 mg. PSHx appendectomy. NKDA. Nonsmoker. Nondrinker.

## 2021-06-29 NOTE — ED ADULT NURSE NOTE - OBJECTIVE STATEMENT
pt aox4, ambulatory c/o right flank pain. pt was seen thursday and dx with kidney stone. pt states he has not been able to pass stone and is still in pain. pt denies dysuria, hematuria. pt also endorses nausea no vomiting. 20g placed in right ac, labs sent.

## 2021-06-29 NOTE — H&P ADULT - ASSESSMENT
47yo M with renal colic and LENNY secondary to 4mm obstructing R ureteral stone.  Urine culture 6/24 normal  summer    -Admit to Dr. Stiles  -Added on for R URS/LL/stent tomorrow  -NPO at midnight  -Flomax 0.4mg --> continue on discharge for LUTs  -Analgesics/antiemetics prn  -Senna, miralax  -Aggressive hydration  -Strain urine for calculi  -DVT ppx/OOB/ambi  -Discussed with attending Dr. Stiles

## 2021-06-29 NOTE — H&P ADULT - NSHPPHYSICALEXAM_GEN_ALL_CORE
Gen: NAD  Resp: no accessory muscle use, normal work of breathing  CV: no edema  Abd: soft, nondistended, mild RLQ TTP, no rebound, no guarding  : no CVAT BL, no suprapubic TTP

## 2021-06-29 NOTE — ED ADULT TRIAGE NOTE - CHIEF COMPLAINT QUOTE
Pt reports flank pain that started this past Thursday was d/cd from ER Friday. diagnosed with kidney stone. pt also reports constipation since friday, Pt reports   severe pain. pt reports nausea.

## 2021-06-29 NOTE — ED ADULT TRIAGE NOTE - LOCATION:
Left arm; Alert and oriented to person, place, time/situation. normal mood and affect. no apparent risk to self or others.

## 2021-06-30 ENCOUNTER — TRANSCRIPTION ENCOUNTER (OUTPATIENT)
Age: 46
End: 2021-06-30

## 2021-06-30 ENCOUNTER — RESULT REVIEW (OUTPATIENT)
Age: 46
End: 2021-06-30

## 2021-06-30 DIAGNOSIS — N17.9 ACUTE KIDNEY FAILURE, UNSPECIFIED: ICD-10-CM

## 2021-06-30 DIAGNOSIS — E78.5 HYPERLIPIDEMIA, UNSPECIFIED: ICD-10-CM

## 2021-06-30 DIAGNOSIS — K76.0 FATTY (CHANGE OF) LIVER, NOT ELSEWHERE CLASSIFIED: ICD-10-CM

## 2021-06-30 DIAGNOSIS — N23 UNSPECIFIED RENAL COLIC: ICD-10-CM

## 2021-06-30 DIAGNOSIS — Z29.9 ENCOUNTER FOR PROPHYLACTIC MEASURES, UNSPECIFIED: ICD-10-CM

## 2021-06-30 LAB
ALBUMIN SERPL ELPH-MCNC: 3.3 G/DL — SIGNIFICANT CHANGE UP (ref 3.3–5)
ALP SERPL-CCNC: 55 U/L — SIGNIFICANT CHANGE UP (ref 40–120)
ALT FLD-CCNC: 17 U/L — SIGNIFICANT CHANGE UP (ref 4–41)
ANION GAP SERPL CALC-SCNC: 11 MMOL/L — SIGNIFICANT CHANGE UP (ref 7–14)
AST SERPL-CCNC: 14 U/L — SIGNIFICANT CHANGE UP (ref 4–40)
BILIRUB SERPL-MCNC: 0.4 MG/DL — SIGNIFICANT CHANGE UP (ref 0.2–1.2)
BUN SERPL-MCNC: 19 MG/DL — SIGNIFICANT CHANGE UP (ref 7–23)
CALCIUM SERPL-MCNC: 8.6 MG/DL — SIGNIFICANT CHANGE UP (ref 8.4–10.5)
CHLORIDE SERPL-SCNC: 102 MMOL/L — SIGNIFICANT CHANGE UP (ref 98–107)
CO2 SERPL-SCNC: 25 MMOL/L — SIGNIFICANT CHANGE UP (ref 22–31)
CREAT SERPL-MCNC: 2.01 MG/DL — HIGH (ref 0.5–1.3)
CULTURE RESULTS: NO GROWTH — SIGNIFICANT CHANGE UP
GLUCOSE SERPL-MCNC: 99 MG/DL — SIGNIFICANT CHANGE UP (ref 70–99)
HCT VFR BLD CALC: 35 % — LOW (ref 39–50)
HGB BLD-MCNC: 11.4 G/DL — LOW (ref 13–17)
MCHC RBC-ENTMCNC: 28.6 PG — SIGNIFICANT CHANGE UP (ref 27–34)
MCHC RBC-ENTMCNC: 32.6 GM/DL — SIGNIFICANT CHANGE UP (ref 32–36)
MCV RBC AUTO: 87.7 FL — SIGNIFICANT CHANGE UP (ref 80–100)
NRBC # BLD: 0 /100 WBCS — SIGNIFICANT CHANGE UP
NRBC # FLD: 0 K/UL — SIGNIFICANT CHANGE UP
PLATELET # BLD AUTO: 241 K/UL — SIGNIFICANT CHANGE UP (ref 150–400)
POTASSIUM SERPL-MCNC: 4.2 MMOL/L — SIGNIFICANT CHANGE UP (ref 3.5–5.3)
POTASSIUM SERPL-SCNC: 4.2 MMOL/L — SIGNIFICANT CHANGE UP (ref 3.5–5.3)
PROT SERPL-MCNC: 6.2 G/DL — SIGNIFICANT CHANGE UP (ref 6–8.3)
RBC # BLD: 3.99 M/UL — LOW (ref 4.2–5.8)
RBC # FLD: 12.8 % — SIGNIFICANT CHANGE UP (ref 10.3–14.5)
SODIUM SERPL-SCNC: 138 MMOL/L — SIGNIFICANT CHANGE UP (ref 135–145)
SPECIMEN SOURCE: SIGNIFICANT CHANGE UP
WBC # BLD: 8.77 K/UL — SIGNIFICANT CHANGE UP (ref 3.8–10.5)
WBC # FLD AUTO: 8.77 K/UL — SIGNIFICANT CHANGE UP (ref 3.8–10.5)

## 2021-06-30 PROCEDURE — 52356 CYSTO/URETERO W/LITHOTRIPSY: CPT | Mod: RT

## 2021-06-30 PROCEDURE — 99223 1ST HOSP IP/OBS HIGH 75: CPT

## 2021-06-30 RX ADMIN — Medication 650 MILLIGRAM(S): at 13:47

## 2021-06-30 RX ADMIN — HEPARIN SODIUM 5000 UNIT(S): 5000 INJECTION INTRAVENOUS; SUBCUTANEOUS at 21:26

## 2021-06-30 RX ADMIN — HEPARIN SODIUM 5000 UNIT(S): 5000 INJECTION INTRAVENOUS; SUBCUTANEOUS at 13:46

## 2021-06-30 RX ADMIN — HYDROMORPHONE HYDROCHLORIDE 1 MILLIGRAM(S): 2 INJECTION INTRAMUSCULAR; INTRAVENOUS; SUBCUTANEOUS at 10:37

## 2021-06-30 RX ADMIN — Medication 650 MILLIGRAM(S): at 14:40

## 2021-06-30 RX ADMIN — HYDROMORPHONE HYDROCHLORIDE 1 MILLIGRAM(S): 2 INJECTION INTRAMUSCULAR; INTRAVENOUS; SUBCUTANEOUS at 21:26

## 2021-06-30 RX ADMIN — ONDANSETRON 4 MILLIGRAM(S): 8 TABLET, FILM COATED ORAL at 05:06

## 2021-06-30 RX ADMIN — HYDROMORPHONE HYDROCHLORIDE 1 MILLIGRAM(S): 2 INJECTION INTRAMUSCULAR; INTRAVENOUS; SUBCUTANEOUS at 16:58

## 2021-06-30 RX ADMIN — ONDANSETRON 4 MILLIGRAM(S): 8 TABLET, FILM COATED ORAL at 19:04

## 2021-06-30 RX ADMIN — HEPARIN SODIUM 5000 UNIT(S): 5000 INJECTION INTRAVENOUS; SUBCUTANEOUS at 06:19

## 2021-06-30 RX ADMIN — HYDROMORPHONE HYDROCHLORIDE 1 MILLIGRAM(S): 2 INJECTION INTRAMUSCULAR; INTRAVENOUS; SUBCUTANEOUS at 22:00

## 2021-06-30 RX ADMIN — SODIUM CHLORIDE 125 MILLILITER(S): 9 INJECTION, SOLUTION INTRAVENOUS at 02:41

## 2021-06-30 RX ADMIN — ONDANSETRON 4 MILLIGRAM(S): 8 TABLET, FILM COATED ORAL at 13:47

## 2021-06-30 RX ADMIN — HYDROMORPHONE HYDROCHLORIDE 1 MILLIGRAM(S): 2 INJECTION INTRAMUSCULAR; INTRAVENOUS; SUBCUTANEOUS at 10:22

## 2021-06-30 NOTE — CONSULT NOTE ADULT - PROBLEM SELECTOR RECOMMENDATION 9
-d/t obstructing right  4mm obstructing ureteral stone with hydronephrosis, LENNY  -management per urology, IVF, pain control, flomax, f/u cultures  (Urine culture from 6/24 normal  summer)  -plan for cysto/right stent and ?stone extraction today

## 2021-06-30 NOTE — DISCHARGE NOTE NURSING/CASE MANAGEMENT/SOCIAL WORK - NSDCPECAREGIVERED_GEN_ALL_CORE
Medline and carenotes for surgical procedure Stent placement, kidney stones, flomax, tylenol, as well as DC Medications and side effects literature for patient reference./Yes Medline and carenotes for surgical procedure stent, kidney stone, Tylenol, as well as DC Medications and side effects literature for patient reference./Yes

## 2021-06-30 NOTE — CONSULT NOTE ADULT - PROBLEM SELECTOR RECOMMENDATION 4
Likely d/t hyperlipidemia and h/o alcohol use in past  outpt f/u PCP, weight loss, consider statin as outpt

## 2021-06-30 NOTE — DISCHARGE NOTE NURSING/CASE MANAGEMENT/SOCIAL WORK - NSDCPNINST_GEN_ALL_CORE
Schedule follow-up appointment(s) as instructed for stent removal in office. Notify physician for signs/symptoms of infection, including chills and/or fever greater than 101 deg F; nausea, vomiting, and/or diarrhea; increased pain and/or pain not relieved by medications; increased swelling, redness, and/or drainage at incision site(s). You may have intermittent blood tinged urine and slight flank pain when you urinate. This is normal and due to the stent in your ureter. If your urine becomes bright red or with clots, please call the office. No heavy lifting or straining for 2 to 4 weeks. Drink plenty of fluids and take over-the-counter stool softeners to prevent constipation, which can be a side effect of some pain medications.

## 2021-06-30 NOTE — CONSULT NOTE ADULT - ASSESSMENT
47 yo male with h/o HLD,  remote nephrolithiasis (stones x2, first one in 1999 and 2nd one in 2002), never seen by urology, who presents to ED with severe right flank pain for the past 6 days, associated with nausea/vomiting, low grade temp 99F at home, denies hematuria or dysuria. Pt went to ED on 6/24,  CT abd/pelvis reveaked 4mm obstructing right ureteral stone with hydronephrosis, associated with LENNY creat 2, plan for cysto, R stent today

## 2021-06-30 NOTE — CONSULT NOTE ADULT - PROBLEM SELECTOR RECOMMENDATION 2
-in setting of obstructive uropathy from obstructing 4mm R ureteral stone, R hydronephrosis on CT  -creat bump from baseline 1.1 to 2  -IVF, plan for cysto/stent, renal fxn should improve after procedure  -avoid nephrotoxins (NSAIDs, contras)  -monitor urine outpt and trend creat -in setting of obstructive uropathy from obstructing 4mm R ureteral stone, R hydronephrosis on CT  -creat bump from baseline 1.1 to 2  -IVF, plan for cysto/stent, renal fxn should improve after procedure  -avoid nephrotoxins (NSAIDs, contras), pt was given toradol 15 mg x1 on 6/29, avoid Toradol  -monitor urine outpt and trend creat

## 2021-06-30 NOTE — DISCHARGE NOTE NURSING/CASE MANAGEMENT/SOCIAL WORK - NSDPDISTO_GEN_ALL_CORE
Home Pt voiding without difficulty, , VS stable Afebrile. pt with positive bowel sounds trev po diet. seen by MD and cleared for Dc to home as per safe Dc plan./Home

## 2021-06-30 NOTE — CONSULT NOTE ADULT - SUBJECTIVE AND OBJECTIVE BOX
Missy Rodriguez MD  Pager [phone]    HPI:  45 yo male with h/o HLD,  remote nephrolithiasis (stones x2, first one in  and 2nd one in ), never seen by urology, who presents to ED with severe right flank pain for the past 6 days, associated with nausea/vomiting, low grade temp 99F at home, denies hematuria or dysuria. Pt went to ED on ,  CT abd/pelvis reveaked 4mm obstructing right ureteral stone with hydronephrosis. He was discharged on oxycodone, which he did not tolerate well at home (felt delirious, constipation) and did not control his pain well.  Pt came to ED again on . Lab revealed LENNY with creat from baseline 1.1 to 1.9 to 2.  Urine culture from  normal  summer.  Patient is scheduled for cysto/R stent today.    PAST MEDICAL & SURGICAL HISTORY:  No pertinent past medical history    History of appendectomy    Review of Systems:   CONSTITUTIONAL: No fever, weight loss, or fatigue  EYES: No eye pain, visual disturbances, or discharge  ENMT:  No difficulty hearing, tinnitus, vertigo; No sinus or throat pain  NECK: No pain or stiffness  BREASTS: No pain, masses, or nipple discharge  RESPIRATORY: No cough, wheezing, chills or hemoptysis; No shortness of breath  CARDIOVASCULAR: No chest pain, palpitations, dizziness, or leg swelling  GASTROINTESTINAL: Left flank pain, + nausea, vomiting, no hematemesis; No diarrhea +constipation. No melena or hematochezia.  GENITOURINARY: No dysuria, frequency, hematuria, or incontinence  NEUROLOGICAL: No headaches, memory loss, loss of strength, numbness, or tremors  SKIN: No itching, burning, rashes, or lesions   LYMPH NODES: No enlarged glands  ENDOCRINE: No heat or cold intolerance; No hair loss  MUSCULOSKELETAL: No joint pain or swelling; No muscle, back, or extremity pain  PSYCHIATRIC: No depression, anxiety, mood swings, or difficulty sleeping  HEME/LYMPH: No easy bruising, or bleeding gums  ALLERY AND IMMUNOLOGIC: No hives or eczema    Allergies    No Known Allergies    Intolerances    Social History: denies smoking or alcohol abuse, quit alcohol drinking in Oct 2020, used to drink beer    FAMILY HISTORY:  Father had heart disease/bypass surgery    Home Medications:  flomax      MEDICATIONS  (STANDING):  heparin   Injectable 5000 Unit(s) SubCutaneous every 8 hours  lactated ringers. 1000 milliLiter(s) (125 mL/Hr) IV Continuous <Continuous>  polyethylene glycol 3350 17 Gram(s) Oral daily  tamsulosin 0.4 milliGRAM(s) Oral at bedtime    MEDICATIONS  (PRN):  acetaminophen   Tablet .. 650 milliGRAM(s) Oral every 6 hours PRN Mild Pain (1 - 3)  HYDROmorphone  Injectable 0.5 milliGRAM(s) IV Push every 4 hours PRN Moderate Pain (4 - 6)  HYDROmorphone  Injectable 1 milliGRAM(s) IV Push every 4 hours PRN Severe Pain (7 - 10)  ondansetron Injectable 4 milliGRAM(s) IV Push every 6 hours PRN Nausea and/or Vomiting  senna 2 Tablet(s) Oral at bedtime PRN Constipation      Vital Signs Last 24 Hrs  T(C): 37.2 (2021 09:12), Max: 37.5 (2021 21:35)  T(F): 98.9 (2021 09:12), Max: 99.5 (2021 21:35)  HR: 76 (2021 09:12) (63 - 80)  BP: 147/73 (2021 09:12) (133/81 - 150/84)  BP(mean): --  RR: 18 (2021 09:12) (16 - 20)  SpO2: 100% (2021 09:12) (96% - 100%)  CAPILLARY BLOOD GLUCOSE        I&O's Summary    :  -  2021 07:00  --------------------------------------------------------  IN: 0 mL / OUT: 300 mL / NET: -300 mL    2021 07:  -  2021 11:21  --------------------------------------------------------  IN: 0 mL / OUT: 450 mL / NET: -450 mL        PHYSICAL EXAM:  GENERAL: NAD, well-developed  HEAD:  Atraumatic, Normocephalic  EYES: EOMI, PERRLA, conjunctiva and sclera clear  NECK: Supple, No JVD  CHEST/LUNG: Clear to auscultation bilaterally; No wheeze  HEART: Regular rate and rhythm; No murmurs, rubs, or gallops  ABDOMEN: Soft, Nontender, Nondistended; Bowel sounds present  EXTREMITIES:  2+ Peripheral Pulses, No clubbing, cyanosis, or edema  PSYCH: AAOx3  NEUROLOGY: non-focal  SKIN: No rashes or lesions    LABS:                        11.4   8.77  )-----------( 241      ( 2021 06:25 )             35.0         138  |  102  |  19  ----------------------------<  99  4.2   |  25  |  2.01<H>    Ca    8.6      2021 06:25    TPro  6.2  /  Alb  3.3  /  TBili  0.4  /  DBili  x   /  AST  14  /  ALT  17  /  AlkPhos  55            Urinalysis Basic - ( 2021 13:21 )    Color: Light Yellow / Appearance: Clear / S.016 / pH: x  Gluc: x / Ketone: Negative  / Bili: Negative / Urobili: 3 mg/dL   Blood: x / Protein: Negative / Nitrite: Negative   Leuk Esterase: Negative / RBC: x / WBC x   Sq Epi: x / Non Sq Epi: x / Bacteria: x      Microbiology Culture Results:   <10,000 CFU/mL Normal Urogenital Summer (21 @ 08:00)      RADIOLOGY & ADDITIONAL TESTS:    Imaging Personally Reviewed:  < from: CT Abdomen and Pelvis No Cont (21 @ 13:40) >  IMPRESSION:  Mild to moderate right hydroureteronephrosis, slightly increased since 2021. A 0.4 cm calculus is seen in the mid right ureter and has moved slightly distally since the prior study. Increased infiltration of the right perinephric fat is noted. Underlying infection cannot be excluded.    Hepatomegaly and marked diffuse hepatic steatosis.    < from: CT Abdomen and Pelvis No Cont (21 @ 09:57) >  0.4 cm calculus in proximal right ureter with mild right hydronephrosis.  Hepatomegaly and steatosis.      Consultant(s) Notes Reviewed:      Care Discussed with Consultants/Other Providers: urology JUAN JOSÉ Carballo, for cysto/stent today

## 2021-06-30 NOTE — DISCHARGE NOTE NURSING/CASE MANAGEMENT/SOCIAL WORK - PATIENT PORTAL LINK FT
You can access the FollowMyHealth Patient Portal offered by [facility] by registering at the following website: [email or web address]. By joining [facility]’s FollowMyHealth portal, you will also be able to view your health information using other applications (apps) compatible with our system.

## 2021-07-01 VITALS
TEMPERATURE: 99 F | RESPIRATION RATE: 17 BRPM | OXYGEN SATURATION: 95 % | HEART RATE: 95 BPM | DIASTOLIC BLOOD PRESSURE: 80 MMHG | SYSTOLIC BLOOD PRESSURE: 146 MMHG

## 2021-07-01 PROCEDURE — 99232 SBSQ HOSP IP/OBS MODERATE 35: CPT

## 2021-07-01 PROCEDURE — 88300 SURGICAL PATH GROSS: CPT | Mod: 26

## 2021-07-01 RX ORDER — TAMSULOSIN HYDROCHLORIDE 0.4 MG/1
1 CAPSULE ORAL
Qty: 30 | Refills: 0
Start: 2021-07-01 | End: 2021-07-30

## 2021-07-01 RX ORDER — PHENAZOPYRIDINE HCL 100 MG
1 TABLET ORAL
Qty: 3 | Refills: 0
Start: 2021-07-01 | End: 2021-07-01

## 2021-07-01 RX ORDER — POLYETHYLENE GLYCOL 3350 17 G/17G
17 POWDER, FOR SOLUTION ORAL ONCE
Refills: 0 | Status: COMPLETED | OUTPATIENT
Start: 2021-07-01 | End: 2021-07-01

## 2021-07-01 RX ORDER — POLYETHYLENE GLYCOL 3350 17 G/17G
17 POWDER, FOR SOLUTION ORAL
Qty: 0 | Refills: 0 | DISCHARGE
Start: 2021-07-01

## 2021-07-01 RX ORDER — PHENAZOPYRIDINE HCL 100 MG
200 TABLET ORAL ONCE
Refills: 0 | Status: COMPLETED | OUTPATIENT
Start: 2021-07-01 | End: 2021-07-01

## 2021-07-01 RX ADMIN — POLYETHYLENE GLYCOL 3350 17 GRAM(S): 17 POWDER, FOR SOLUTION ORAL at 09:11

## 2021-07-01 RX ADMIN — Medication 200 MILLIGRAM(S): at 06:58

## 2021-07-01 RX ADMIN — HEPARIN SODIUM 5000 UNIT(S): 5000 INJECTION INTRAVENOUS; SUBCUTANEOUS at 06:11

## 2021-07-01 NOTE — DISCHARGE NOTE PROVIDER - CARE PROVIDER_API CALL
Igor Stiles  Urology  [address] [place] [address]  Phone: [phone]  Fax: [phone]  Follow Up Time: 1-3 days

## 2021-07-01 NOTE — PROGRESS NOTE ADULT - SUBJECTIVE AND OBJECTIVE BOX
Note    Post op Check    s/p : Cystoscopy with right ureteroscopy and lithotripsy, right ureteral stent placement  EBL 0ml    Pt seen and examined, reporting pain with urination, denies nausea, denies fevers.  Voiding, light punch color.     Vital Signs Last 24 Hrs  T(C): 36.8 (01 Jul 2021 01:43), Max: 37.4 (30 Jun 2021 05:05)  T(F): 98.3 (01 Jul 2021 01:43), Max: 99.3 (30 Jun 2021 05:05)  HR: 69 (01 Jul 2021 01:43) (69 - 76)  BP: 144/69 (01 Jul 2021 01:43) (138/69 - 155/79)  BP(mean): --  RR: 18 (01 Jul 2021 01:43) (18 - 18)  SpO2: 94% (01 Jul 2021 01:43) (94% - 100%)    I&O's Summary    29 Jun 2021 07:01  -  30 Jun 2021 07:00  --------------------------------------------------------  IN: 0 mL / OUT: 300 mL / NET: -300 mL    30 Jun 2021 07:01  -  01 Jul 2021 03:21  --------------------------------------------------------  IN: 1500 mL / OUT: 2300 mL / NET: -800 mL    PHYSICAL EXAM:   Constitutional: well appearing, A+O, no distress    Respiratory: clear     Cardiovascular: regular     Gastrointestinal: soft, nontender    Genitourinary: light clear punch color urine ~300.  Reports he voided 2 times prior that he discarded.     Extremities: venodynes in place  
Dr. Missy Rodriguez  Pager [phone]    PROGRESS NOTE:     Patient is a 46y old  Male who presents with a chief complaint of renal colic, LENNY (2021 07:03)      SUBJECTIVE / OVERNIGHT EVENTS: pt feels well, no flank pain  ADDITIONAL REVIEW OF SYSTEMS: afebrile, no chest pain/sob    MEDICATIONS  (STANDING):  heparin   Injectable 5000 Unit(s) SubCutaneous every 8 hours  tamsulosin 0.4 milliGRAM(s) Oral at bedtime    MEDICATIONS  (PRN):  acetaminophen   Tablet .. 650 milliGRAM(s) Oral every 6 hours PRN Mild Pain (1 - 3)  HYDROmorphone  Injectable 0.5 milliGRAM(s) IV Push every 4 hours PRN Moderate Pain (4 - 6)  HYDROmorphone  Injectable 1 milliGRAM(s) IV Push every 4 hours PRN Severe Pain (7 - 10)  ondansetron Injectable 4 milliGRAM(s) IV Push every 6 hours PRN Nausea and/or Vomiting  senna 2 Tablet(s) Oral at bedtime PRN Constipation      CAPILLARY BLOOD GLUCOSE        I&O's Summary    2021 07:01  -  2021 07:00  --------------------------------------------------------  IN: 1500 mL / OUT: 2700 mL / NET: -1200 mL        PHYSICAL EXAM:  Vital Signs Last 24 Hrs  T(C): 37.1 (2021 09:12), Max: 37.4 (2021 21:17)  T(F): 98.7 (2021 09:12), Max: 99.3 (2021 21:17)  HR: 95 (2021 09:12) (65 - 95)  BP: 146/80 (2021 09:12) (141/81 - 155/79)  BP(mean): --  RR: 17 (2021 09:12) (17 - 18)  SpO2: 95% (2021 09:12) (94% - 100%)  GENERAL: NAD, well-developed  HEAD:  Atraumatic, Normocephalic  EYES: EOMI, PERRLA, conjunctiva and sclera clear  NECK: Supple, No JVD  CHEST/LUNG: Clear to auscultation bilaterally; No wheeze  HEART: Regular rate and rhythm; No murmurs, rubs, or gallops  ABDOMEN: Soft, Nontender, Nondistended; Bowel sounds present  EXTREMITIES:  2+ Peripheral Pulses, No clubbing, cyanosis, or edema  PSYCH: AAOx3  NEUROLOGY: non-focal  SKIN: No rashes or lesions    LABS:                        11.4   8.77  )-----------( 241      ( 2021 06:25 )             35.0         138  |  102  |  19  ----------------------------<  99  4.2   |  25  |  2.01<H>    Ca    8.6      2021 06:25    TPro  6.2  /  Alb  3.3  /  TBili  0.4  /  DBili  x   /  AST  14  /  ALT  17  /  AlkPhos  55            Urinalysis Basic - ( 2021 13:21 )    Color: Light Yellow / Appearance: Clear / S.016 / pH: x  Gluc: x / Ketone: Negative  / Bili: Negative / Urobili: 3 mg/dL   Blood: x / Protein: Negative / Nitrite: Negative   Leuk Esterase: Negative / RBC: x / WBC x   Sq Epi: x / Non Sq Epi: x / Bacteria: x        Culture - Urine (collected 2021 16:58)  Source: .Urine Clean Catch (Midstream)  Final Report (2021 23:57):    No growth        RADIOLOGY & ADDITIONAL TESTS:  Results Reviewed:   Imaging Personally Reviewed:  Electrocardiogram Personally Reviewed:    COORDINATION OF CARE:  Care Discussed with Consultants/Other Providers [Y/N]: urology JUAN JOSÉ Hassan, s/p cysto/stent/stone extraction, d/c home today w/ outpt f/u Dr. Stiles  Prior or Outpatient Records Reviewed [Y/N]:  
No new events overnite  Afeb 138/69 76 96%RA    Pt still has pain  Abd- soft NT ND   Voiding
Overnight events:  None, remained afebrile    Subjective:  Pt c/o slight stent discomfort, no other complaints    Objective:    Vital signs  T(C): , Max: 37.4 (06-30-21 @ 21:17)  HR: 65 (07-01-21 @ 06:03)  BP: 145/75 (07-01-21 @ 06:03)  SpO2: 96% (07-01-21 @ 06:03)  Wt(kg): --    Output   Void: 1150  06-30 @ 07:01  -  07-01 @ 07:00  --------------------------------------------------------  IN: 1500 mL / OUT: 2700 mL / NET: -1200 mL        Gen: NAD  Abd: soft, nontender, no CVAT      Labs: none today                        11.4   8.77  )-----------( 241      ( 30 Jun 2021 06:25 )             35.0     30 Jun 2021 06:25    138    |  102    |  19     ----------------------------<  99     4.2     |  25     |  2.01     Ca    8.6        30 Jun 2021 06:25        Urine Cx:   Culture - Urine (06.29.21 @ 16:58)    Specimen Source: .Urine Clean Catch (Midstream)    Culture Results:   No growth      Imaging:  < from: CT Abdomen and Pelvis No Cont (06.29.21 @ 13:40) >  IMPRESSION:  Mild to moderate right hydroureteronephrosis, slightly increased since 6/24/2021. A 0.4 cm calculus is seen in the mid right ureter and has moved slightly distally since the prior study. Increased infiltration of the right perinephric fat is noted. Underlying infection cannot be excluded.    Hepatomegaly and marked diffuse hepatic steatosis.      
Stable.

## 2021-07-01 NOTE — PROGRESS NOTE ADULT - PROBLEM SELECTOR PLAN 1
- -d/t obstructing right  4mm obstructing ureteral stone with hydronephrosis, LENNY  -management per urology, IVF, pain control, flomax, f/u cultures  (Urine culture from 6/24 normal  summer)  -s/p cysto with right ureteroscopy and lithotripsy, right ureteral stent placement  -dc home today per primary team, outpt f/u Dr. Stiles for stent removal

## 2021-07-01 NOTE — DISCHARGE NOTE PROVIDER - NSDCMRMEDTOKEN_GEN_ALL_CORE_FT
Flomax 0.4 mg oral capsule: 1 cap(s) orally once a day MDD:1 tab   mg oral tablet: 1 tab(s) orally every 8 hours, As Needed - for moderate pain  tamsulosin 0.4 mg oral capsule: 1 cap(s) orally once a day (at bedtime)   Flomax 0.4 mg oral capsule: 1 cap(s) orally once a day MDD:1 tab   mg oral tablet: 1 tab(s) orally every 8 hours, As Needed - for moderate pain  phenazopyridine 200 mg oral tablet: 1 tab(s) orally 3 times a day (after meals)   tamsulosin 0.4 mg oral capsule: 1 cap(s) orally once a day (at bedtime)   Flomax 0.4 mg oral capsule: 1 cap(s) orally once a day MDD:1 tab   mg oral tablet: 1 tab(s) orally every 8 hours, As Needed - for moderate pain  phenazopyridine 200 mg oral tablet: 1 tab(s) orally 3 times a day (after meals)   polyethylene glycol 3350 oral powder for reconstitution: 17 gram(s) orally daily as needed for constipation  tamsulosin 0.4 mg oral capsule: 1 cap(s) orally once a day (at bedtime)

## 2021-07-01 NOTE — DISCHARGE NOTE PROVIDER - NSDCCPCAREPLAN_GEN_ALL_CORE_FT
PRINCIPAL DISCHARGE DIAGNOSIS  Diagnosis: Renal colic on right side  Assessment and Plan of Treatment: right URS and right ureteral stent.   You can resume a regular diet. You can resume work. You should take over the counter medications for pain control.   You should continue to take flomax.       PRINCIPAL DISCHARGE DIAGNOSIS  Diagnosis: Renal colic on right side  Assessment and Plan of Treatment: You can resume a regular diet. You can resume work.   You should continue to take flomax.  You may have intermittent blood tinged urine and slight flank pain when you urinate.  This is normal and due to the stent in your ureter.   If your urine becomes bright red or with clots, please call the office.  You have a ureteral stent to help with tissue healing and drainage of the kidney. Take flomax daily for stent discomfort. The stent is temporary, and must be eventually removed or it may cause problems with infection and kidney damage if left in place greater than 3 months.  You have a stent on a string taped to your penis, you may get this wet but do not remove the tape.  Dr. Stiles's office will call you with the time of your follow up appointment on Tuesday 7/6 for stent removal.  Call the office if you have fever greater than 101, difficulty urinating, pain not relieved with pain medication, nausea/vomiting.

## 2021-07-01 NOTE — DISCHARGE NOTE PROVIDER - HOSPITAL COURSE
47 yo male with h/o HLD,  remote nephrolithiasis (stones x2, first one in 1999 and 2nd one in 2002), never seen by urology, who presents to ED with severe right flank pain for the past 6 days, associated with nausea/vomiting, low grade temp 99F at home, denies hematuria or dysuria. Pt went to ED on 6/24,  CT abd/pelvis reveaked 4mm obstructing right ureteral stone with hydronephrosis. He was discharged on oxycodone, which he did not tolerate well at home (felt delirious, constipation) and did not control his pain well.  Pt came to ED again on 6/29. Lab revealed LENNY with creat from baseline 1.1 to 1.9 to 2.  Urine culture from 6/24 normal  summer.  Now s/p right URS, laser lithotripsy and right ureteral stent placement.     You have a stent on a string. Do not remove tape. You may have intermittent blood tinged urine and slight flank pain when you urinate.  This is normal and due to the stent in your ureter.   If your urine becomes bright red or with clots, please call the office.    You should call Dr. Stiles's office to set up appointment to have stent removed.    45 yo male with h/o HLD,  remote nephrolithiasis (stones x2, first one in 1999 and 2nd one in 2002), never seen by urology, who presents to ED with severe right flank pain for the past 6 days, associated with nausea/vomiting, low grade temp 99F at home, denies hematuria or dysuria. Pt went to ED on 6/24,  CT abd/pelvis reveaked 4mm obstructing right ureteral stone with hydronephrosis. He was discharged on oxycodone, which he did not tolerate well at home (felt delirious, constipation) and did not control his pain well.  Pt came to ED again on 6/29. Lab revealed LENNY with creat from baseline 1.1 to 1.9 to 2.  Urine culture from 6/24 normal  summer.  Pt underwent right URS, laser lithotripsy and right ureteral stent placement on 6/30.  Pt remained afebrile and pain controlled, tolerating diet, ambulating and was d/c on 7/1    You have a stent on a string. Do not remove tape. You may have intermittent blood tinged urine and slight flank pain when you urinate.  This is normal and due to the stent in your ureter.   If your urine becomes bright red or with clots, please call the office.    You should call Dr. Stiles's office to set up appointment to have stent removed.

## 2021-07-01 NOTE — BRIEF OPERATIVE NOTE - DISPOSITION
RX called to [facility] at [facility], [place], [place]  For Flagyl 500mg 1 bid, #20 1 refill              Levaquin 500mg 1 every day #10 1 refill    Per Dr. Landon Rich.
floor

## 2021-07-01 NOTE — PROGRESS NOTE ADULT - ASSESSMENT
46 male s/p Cystoscopy with right ureteroscopy and lithotripsy, right ureteral stent placement, stable.    -Strict I&O's  -Analgesia prn  -Antiemetics prn  -DVT prophylaxis  -Incentive spirometry  -Regular  -OOB  
This 45 yo M admitted with R. 4mm uret stone/LENNY  Plan:  - OR today for stent  - NPO  - check culture
47 yo M admitted 6/29/2021 renal colic, LENNY secondary to R. 4mm ureteral calculus  .    6/30: pt underwent uncomplicated placement of right ureteral stent (on string), Ucx neg  7/1: pt remained afebrile, pain controlled, some stent irritation, voiding well    Plan:  -add pyridium  -IVL  -d/c home later today to f/u with Dr. Stiles for stent removal  -DVT prophy, IS, OOB, ambulate  
47 yo male with h/o HLD,  remote nephrolithiasis (stones x2, first one in 1999 and 2nd one in 2002), never seen by urology, who presents to ED with severe right flank pain for the past 6 days, associated with nausea/vomiting, low grade temp 99F at home, denies hematuria or dysuria. Pt went to ED on 6/24,  CT abd/pelvis reveaked 4mm obstructing right ureteral stone with hydronephrosis, associated with LENNY creat 2, s/p right ureteroscopy and lithotripsy, right ureteral stent placement 6/30

## 2021-07-01 NOTE — PROGRESS NOTE ADULT - PROBLEM SELECTOR PLAN 4
Likely d/t hyperlipidemia and h/o alcohol use in past  outpt f/u PCP, weight loss, consider statin as outpt.

## 2021-07-01 NOTE — BRIEF OPERATIVE NOTE - NSICDXBRIEFPROCEDURE_GEN_ALL_CORE_FT
PROCEDURES:  Cystoscopy with right ureteroscopy and lithotripsy 01-Jul-2021 01:12:22  Courtney Paiz

## 2021-07-01 NOTE — BRIEF OPERATIVE NOTE - OPERATION/FINDINGS
Right URS, LL, right ureteral stent placement Spoke to  Jerrod. Confirmed dose of Pramipexole is 0.25mg three tablets by mouth at bedtime.  Dose in [employer] is also 0.25mg TID

## 2021-07-01 NOTE — PROGRESS NOTE ADULT - PROBLEM SELECTOR PLAN 2
-in setting of obstructive uropathy from obstructing 4mm R ureteral stone, R hydronephrosis on CT  -creat bump from baseline 1.1 to 2  -IVF, plan for cysto/stent, renal fxn should improve after procedure  -avoid nephrotoxins (NSAIDs, contras), pt was given toradol 15 mg x1 on 6/29, avoid Toradol  -monitor urine outpt and trend creat.  -no new lab, advised pt to f/u Dr. Stiles and PCP to repeat labs -in setting of obstructive uropathy from obstructing 4mm R ureteral stone, R hydronephrosis on CT  -creat bump from baseline 1.1 to 2  -IVF, s/p cysto/stent, renal fxn should improve after procedure  -avoid nephrotoxins (NSAIDs, contras), pt was given toradol 15 mg x1 on 6/29, avoid Toradol  -monitor urine outpt and trend creat.  -no new lab, advised pt to f/u Dr. Stiles and PCP to repeat labs

## 2021-07-06 ENCOUNTER — APPOINTMENT (OUTPATIENT)
Dept: UROLOGY | Facility: CLINIC | Age: 46
End: 2021-07-06
Payer: COMMERCIAL

## 2021-07-06 VITALS
HEIGHT: 67 IN | WEIGHT: 210 LBS | DIASTOLIC BLOOD PRESSURE: 90 MMHG | SYSTOLIC BLOOD PRESSURE: 152 MMHG | TEMPERATURE: 98.1 F | HEART RATE: 93 BPM | BODY MASS INDEX: 32.96 KG/M2

## 2021-07-06 PROBLEM — Z78.9 OTHER SPECIFIED HEALTH STATUS: Chronic | Status: ACTIVE | Noted: 2021-06-29

## 2021-07-06 PROCEDURE — 99212 OFFICE O/P EST SF 10 MIN: CPT

## 2021-07-06 NOTE — REVIEW OF SYSTEMS
[Fever] : fever [Chills] : chills [Feeling Poorly] : feeling poorly [Feeling Tired] : feeling tired [Abdominal Pain] : abdominal pain [Vomiting] : vomiting [Constipation] : constipation [Diarrhea] : diarrhea [Heartburn] : heartburn [Melena] : meljuan [see HPI] : see HPI [History of kidney stones] : history of kidney stones [Wake up at night to urinate  How many times?  ___] : wakes up to urinate [unfilled] times during the night [Negative] : Heme/Lymph

## 2021-07-07 LAB
ANION GAP SERPL CALC-SCNC: 14 MMOL/L
BUN SERPL-MCNC: 13 MG/DL
CALCIUM SERPL-MCNC: 9.9 MG/DL
CHLORIDE SERPL-SCNC: 101 MMOL/L
CO2 SERPL-SCNC: 26 MMOL/L
CREAT SERPL-MCNC: 1.27 MG/DL
GLUCOSE SERPL-MCNC: 91 MG/DL
POTASSIUM SERPL-SCNC: 4.2 MMOL/L
SODIUM SERPL-SCNC: 141 MMOL/L

## 2021-07-08 LAB — NIDUS STONE QN: SIGNIFICANT CHANGE UP

## 2021-07-08 NOTE — HISTORY OF PRESENT ILLNESS
[FreeTextEntry1] : f/u from admission for obstructing ureteral stone with ARF.\par now s/p ureteroscopy and laser lithotripsy JJ stent.\par had prior stones 2 times in past\par no prior w/u. \par reviewed CT - no other stones.

## 2021-07-08 NOTE — ASSESSMENT
[FreeTextEntry1] : stent removed.\par check BMP to ensure GFR improved\par plan on litholink given prior stones.

## 2021-08-04 LAB — SURGICAL PATHOLOGY STUDY: SIGNIFICANT CHANGE UP

## 2021-11-12 ENCOUNTER — APPOINTMENT (OUTPATIENT)
Dept: UROLOGY | Facility: CLINIC | Age: 46
End: 2021-11-12

## 2022-01-05 ENCOUNTER — APPOINTMENT (OUTPATIENT)
Dept: UROLOGY | Facility: CLINIC | Age: 47
End: 2022-01-05

## 2022-05-11 ENCOUNTER — APPOINTMENT (OUTPATIENT)
Dept: UROLOGY | Facility: CLINIC | Age: 47
End: 2022-05-11
Payer: COMMERCIAL

## 2022-05-11 DIAGNOSIS — N20.1 CALCULUS OF URETER: ICD-10-CM

## 2022-05-11 PROCEDURE — 99213 OFFICE O/P EST LOW 20 MIN: CPT

## 2022-05-12 LAB
CALCIUM SERPL-MCNC: 9.6 MG/DL
PARATHYROID HORMONE INTACT: 49 PG/ML

## 2022-05-15 PROBLEM — N20.1 URETERAL STONE: Status: ACTIVE | Noted: 2021-07-06

## 2022-05-15 NOTE — HISTORY OF PRESENT ILLNESS
[FreeTextEntry1] : f/u from admission for obstructing ureteral stone with ARF.\par now s/p ureteroscopy and laser lithotripsy JJ stent.\par had prior stones 2 times in past\par no prior w/u. \par reviewed CT - no other stones. \par \par 5/22 have not seen since last year. No stone symptoms or hematuria.\par reviewed the Litholink from last year: volume 1.4 and elevated CaOx\par \par

## 2022-05-15 NOTE — ASSESSMENT
[FreeTextEntry1] : reviewed the litholink - needs to drink more. will check PTH level\par also needs ULS

## 2023-04-03 NOTE — PATIENT PROFILE ADULT - NSPROMEDSADMININFO_GEN_A_NUR
04/03/23      Boris Cristina        INR not obtained today as ordered as it is reported patient is out of test strips. It is unknown when they will be received.     1.5mg of Warfarin Sunday/Thursday; 2mg all other days     Recheck INR on home monitor Monday 4/03/23  If test strips are obtained sooner than Monday please call [facility] to discuss possible earlier test date.     Updated Warfarin Rx sent to [facility]     Please notify [facility] of any medication changes or abnormal findings related to being on Warfarin; [phone], option 2    Sincerely,         Darius Mejia, RN  [facility], Cardiology  [address] [place] [address]  Phone: [phone]  Fax: [phone]               no concerns

## 2024-01-08 NOTE — ED CDU PROVIDER INITIAL DAY NOTE - PRO INTERPRETER NEED 2
English
How Many Skin Cancers Have You Had?: more than one
What Is The Reason For Today's Visit?: History of Non-Melanoma Skin Cancer
When Was Your Last Cancer Diagnosed?: 2022

## 2024-03-02 NOTE — DISCHARGE NOTE NURSING/CASE MANAGEMENT/SOCIAL WORK - NSPROEXTENSIONSOFSELF_GEN_A_NUR
13.0   4.54  )-----------( 287      ( 02 Mar 2024 12:21 )             41.2     144  |  103  |  20  ----------------------------<  131<H>     03-02  4.8   |  30  |  1.07    Ca    9.2      02 Mar 2024 12:21    TPro  7.5  /  Alb  3.3  /  TBili  1.3<H>  /  DBili  x   /  AST  44<H>  /  ALT  18  /  AlkPhos  305<H>  03-02        12:21 - VBG - pH: 7.34  | pCO2: 59    | pO2: 22    | Lactate: 2.4      Pro-BNP: 89459 (03-02-24 @ 12:21)    CXR interpreted by myself: mild vascular congestion eyeglasses

## 2025-01-03 ENCOUNTER — NON-APPOINTMENT (OUTPATIENT)
Age: 50
End: 2025-01-03

## 2025-02-27 NOTE — ED ADULT NURSE NOTE - HIV OFFER
POST-OP DIAGNOSIS:  Renal calculus, right 27-Feb-2025 09:01:45  Rito Tovar   Previously Declined (within the last year)